# Patient Record
Sex: FEMALE | Race: WHITE | NOT HISPANIC OR LATINO
[De-identification: names, ages, dates, MRNs, and addresses within clinical notes are randomized per-mention and may not be internally consistent; named-entity substitution may affect disease eponyms.]

---

## 2017-11-14 ENCOUNTER — OTHER (OUTPATIENT)
Age: 37
End: 2017-11-14

## 2017-11-21 ENCOUNTER — APPOINTMENT (OUTPATIENT)
Dept: ORTHOPEDIC SURGERY | Facility: CLINIC | Age: 37
End: 2017-11-21

## 2018-11-04 ENCOUNTER — TRANSCRIPTION ENCOUNTER (OUTPATIENT)
Age: 38
End: 2018-11-04

## 2018-11-26 ENCOUNTER — APPOINTMENT (OUTPATIENT)
Dept: ORTHOPEDIC SURGERY | Facility: CLINIC | Age: 38
End: 2018-11-26
Payer: COMMERCIAL

## 2018-11-26 PROCEDURE — 99214 OFFICE O/P EST MOD 30 MIN: CPT

## 2018-11-30 ENCOUNTER — OTHER (OUTPATIENT)
Age: 38
End: 2018-11-30

## 2018-12-13 ENCOUNTER — APPOINTMENT (OUTPATIENT)
Dept: PHYSICAL MEDICINE AND REHAB | Facility: CLINIC | Age: 38
End: 2018-12-13
Payer: COMMERCIAL

## 2018-12-13 VITALS
BODY MASS INDEX: 42.17 KG/M2 | SYSTOLIC BLOOD PRESSURE: 179 MMHG | DIASTOLIC BLOOD PRESSURE: 85 MMHG | HEIGHT: 63 IN | WEIGHT: 238 LBS

## 2018-12-13 DIAGNOSIS — S16.1XXA STRAIN OF MUSCLE, FASCIA AND TENDON AT NECK LEVEL, INITIAL ENCOUNTER: ICD-10-CM

## 2018-12-13 PROCEDURE — 99203 OFFICE O/P NEW LOW 30 MIN: CPT

## 2019-01-07 ENCOUNTER — APPOINTMENT (OUTPATIENT)
Dept: ORTHOPEDIC SURGERY | Facility: CLINIC | Age: 39
End: 2019-01-07
Payer: COMMERCIAL

## 2019-01-07 VITALS
BODY MASS INDEX: 42.17 KG/M2 | WEIGHT: 238 LBS | HEART RATE: 83 BPM | DIASTOLIC BLOOD PRESSURE: 82 MMHG | HEIGHT: 63 IN | SYSTOLIC BLOOD PRESSURE: 138 MMHG

## 2019-01-07 DIAGNOSIS — M54.12 RADICULOPATHY, CERVICAL REGION: ICD-10-CM

## 2019-01-07 PROCEDURE — 99214 OFFICE O/P EST MOD 30 MIN: CPT

## 2019-01-07 NOTE — REVIEW OF SYSTEMS
[Discharge] : no discharge [Nasal Discharge] : no nasal discharge [Cough] : no cough [FreeTextEntry9] : left shoulder pain

## 2019-01-07 NOTE — PHYSICAL EXAM
[de-identified] : Patient is well appearing, in non acute distress with nonlabored breathing and appropriate mood and affect. Extra-ocular movements intact and no nasal discharge.\par \par Right UE warm and well perfused; palpable radial pulse\par SILT C5-T1\par motor intact to finger flexion, wrist extension and flexion, elbow flexion and extension, supination and pronation, deltoid\par \par Left UE warm and well perfused; palpable radial pulse\par SILT C5-T1\par motor intact to finger flexion, wrist extension and flexion, elbow flexion and extension, supination and pronation, deltoid\par \par \par Right Shoulder \par \par Appearance\par negative atrophy of musculature\par negative winging\par \par Tenderness to Palpation about the shoulder is negative\par \par Range of Motion: Active / Passive\par Forward Elevation: 140\par Abduction: And 35\par ER at 0 degrees of abduction: 45\par ER at 90 degrees of abduction: 80/85\par Internal Rotation T. 12\par \par Motor Strength\par Supraspinatus: 5 /5\par Infraspinatus: 5 /5\par Subscapularis: 5 /5\par Teres Minor: 5/ 5\par \par Provocative Maneuvers\par negative Drop Arm\par negative Belly Press\par negative Hornblowers\par negative Franklin\par negative Neer\par negative Speeds\par \par Left Shoulder \par \par Appearance\par negative atrophy of musculature\par negative winging\par \par Tenderness to Palpation about the shoulder is positive at the acromion, greater tuberosity, trapezius, and paraspinal muscles\par \par Range of Motion: Active / Passive\par Forward Elevation: 120/140\par Abduction: 130/140\par ER at 0 degrees of abduction: 40\par ER at 90 degrees of abduction: 80/85\par Internal Rotation 12\par \par Motor Strength\par Supraspinatus: 4 with pain/5\par Infraspinatus: 4 with pain/5\par Subscapularis: 5 /5\par Teres Minor: 5/ 5\par \par Provocative Maneuvers\par negative Drop Arm\par negative Belly Press\par negative Hornblowers\par Positive Franklin\par Positive Neer\par negative Speeds\par \par C spine\par \par negative tenderness to palpation\par Range of motion is painless\par Spurling is negative

## 2019-01-07 NOTE — ASSESSMENT
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 Patient has left shoulder pain and cervicalgia with signs and symptoms, today, most consistent with left shoulder impingement versus rotator cuff tear.  Discussed findings and diagnosis and the risks, benefits, and alternatives of all treatment options.  Given patient's pain has worsened I have ordered an MRI of her left shoulder without contrast. Patient will follow up after MRI. I've also ordered patient meloxicam 15 mg and risks were discussed.

## 2019-01-07 NOTE — HISTORY OF PRESENT ILLNESS
[de-identified] : Ms. HINOJOSA is a 38 year old female who being seen for follow-up of Left shoulder pain. 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 She was unable to afford physical therapy but she went to receive home exercises and has been doing this on her own. She states that she has been taking the meloxicam, 7.5, daily. She notes that since her last visit her pain has gotten somewhat worse. In particular it is quite tender. Patient did see physiatry for her neck and was recommended to continue with the anti-inflammatories and therapy. Was also recommended to followup with neck pain and paraspinal pain continues for consideration of MRI of the C-spine.  Patient states that her carpal tunnel symptoms have improved and are no longer present.

## 2019-01-14 ENCOUNTER — OUTPATIENT (OUTPATIENT)
Dept: OUTPATIENT SERVICES | Facility: HOSPITAL | Age: 39
LOS: 1 days | End: 2019-01-14
Payer: COMMERCIAL

## 2019-01-14 ENCOUNTER — APPOINTMENT (OUTPATIENT)
Dept: MRI IMAGING | Facility: CLINIC | Age: 39
End: 2019-01-14
Payer: COMMERCIAL

## 2019-01-14 DIAGNOSIS — Z00.8 ENCOUNTER FOR OTHER GENERAL EXAMINATION: ICD-10-CM

## 2019-01-14 PROCEDURE — 73221 MRI JOINT UPR EXTREM W/O DYE: CPT

## 2019-01-14 PROCEDURE — 73221 MRI JOINT UPR EXTREM W/O DYE: CPT | Mod: 26,LT

## 2019-01-28 ENCOUNTER — APPOINTMENT (OUTPATIENT)
Dept: ORTHOPEDIC SURGERY | Facility: CLINIC | Age: 39
End: 2019-01-28
Payer: COMMERCIAL

## 2019-01-28 ENCOUNTER — OTHER (OUTPATIENT)
Age: 39
End: 2019-01-28

## 2019-01-28 DIAGNOSIS — M75.42 IMPINGEMENT SYNDROME OF LEFT SHOULDER: ICD-10-CM

## 2019-01-28 PROCEDURE — 99214 OFFICE O/P EST MOD 30 MIN: CPT

## 2019-01-28 RX ORDER — CREAM BASE NO.31
CREAM (GRAM) MISCELLANEOUS
Qty: 100 | Refills: 1 | Status: ACTIVE | COMMUNITY
Start: 2019-01-28 | End: 1900-01-01

## 2019-01-28 NOTE — HISTORY OF PRESENT ILLNESS
[de-identified] : Ms. HINOJOSA is a 38 year old female who is being seen for follow-up of Left shoulder pain. Pain unchanged.

## 2019-01-28 NOTE — REVIEW OF SYSTEMS
[Joint Pain] : joint pain [Chills] : no chills [Discharge] : no discharge [Cough] : no cough [FreeTextEntry9] : Left shoulder

## 2019-01-28 NOTE — PHYSICAL EXAM
[de-identified] : Patient is well appearing, in non acute distress with nonlabored breathing and appropriate mood and affect. Extra-ocular movements intact and no nasal discharge.\par \par Right UE warm and well perfused; palpable radial pulse\par SILT C5-T1\par motor intact to finger flexion, wrist extension and flexion, elbow flexion and extension, supination and pronation, deltoid\par \par Left UE warm and well perfused; palpable radial pulse\par SILT C5-T1\par motor intact to finger flexion, wrist extension and flexion, elbow flexion and extension, supination and pronation, deltoid\par \par Right Shoulder \par \par Appearance\par negative atrophy of musculature\par negative winging\par \par Tenderness to Palpation about the shoulder is negative\par \par Range of Motion: Active / Passive\par Forward Elevation: full \par Abduction: full \par ER at 0 degrees of abduction: full \par ER at 90 degrees of abduction: full \par Internal Rotation full\par \par Motor Strength\par Supraspinatus: 5 /5\par Infraspinatus: 5 /5\par Subscapularis: 5 /5\par Teres Minor: 5/ 5\par \par Provocative Maneuvers\par negative Drop Arm\par negative Belly Press\par negative Hornblowers\par negative Franklin\par negative Neer\par negative Speeds\par \par Left Shoulder \par \par Appearance\par negative atrophy of musculature\par negative winging\par \par Tenderness to Palpation about the shoulder is positive at the greater tuberosity and subdeltoid bursa and trapezius and proximal biceps and AC joint\par \par Range of Motion: Active / Passive\par Forward Elevation: full \par Abduction: full \par ER at 0 degrees of abduction: full \par ER at 90 degrees of abduction: full \par Internal Rotation full\par \par Motor Strength\par Supraspinatus: 4 with pain/5\par Infraspinatus: 4 with pain/5\par Subscapularis: 5 /5\par Teres Minor: 5/ 5\par \par Provocative Maneuvers\par negative Drop Arm\par negative Belly Press\par negative Hornblowers\par Positive Franklin\par Positive Neer\par negative Speeds\par Negative [place]'s\par Positive cross body\par \par C spine\par \par Trapezius positive tenderness to palpation\par Range of motion is pain with rotation to the right\par Spurling is negative [de-identified] : Patient had an MRI of her left shoulder on January 14, 2018 [facility] imaging a [place]. The images were visualized reviewed by me, findings below:\par Advanced arthrosis of the a.c. joint with subchondral cystic change of marrow edema on both sides of the joint. There is trace fluid in the subacromial subdeltoid bursa. There is mild tendinosis of the supraspinatus. No high-grade partial-thickness or full-thickness rotator cuff tear. Biceps is normally situated in the groove without tear. SLAP tear suspected though evaluation is limited by motion. No atrophy.

## 2019-01-28 NOTE — ASSESSMENT
[FreeTextEntry1] : Patient is a 38-year-old female with signs and symptoms consistent with left shoulder rotator cuff tendinitis and a.c. joint arthritis.Discussed findings and diagnosis and the risks, benefits, and alternatives of all treatment options. Patient wished to proceed with a steroid injection today, however, her sugar this morning was 210. She is seeing her PCP tomorrow to have this evaluated. Steroid injection deferred until better control of her blood sugar. In the meantime I've prescribed transdermal compound gel; this discussed. Also discussed activity modification the patient. He she will followup if pain persists and or worsens then we'll consider steroid injection pending better control of her glucose.

## 2020-06-24 ENCOUNTER — APPOINTMENT (OUTPATIENT)
Dept: ORTHOPEDIC SURGERY | Facility: CLINIC | Age: 40
End: 2020-06-24
Payer: COMMERCIAL

## 2020-06-24 VITALS
HEART RATE: 88 BPM | BODY MASS INDEX: 42.17 KG/M2 | HEIGHT: 63 IN | SYSTOLIC BLOOD PRESSURE: 118 MMHG | WEIGHT: 238 LBS | DIASTOLIC BLOOD PRESSURE: 82 MMHG

## 2020-06-24 DIAGNOSIS — M89.8X1 OTHER SPECIFIED DISORDERS OF BONE, SHOULDER: ICD-10-CM

## 2020-06-24 DIAGNOSIS — M54.2 CERVICALGIA: ICD-10-CM

## 2020-06-24 PROCEDURE — 73030 X-RAY EXAM OF SHOULDER: CPT | Mod: LT

## 2020-06-24 PROCEDURE — 99214 OFFICE O/P EST MOD 30 MIN: CPT

## 2020-06-24 PROCEDURE — 72040 X-RAY EXAM NECK SPINE 2-3 VW: CPT

## 2020-06-24 NOTE — PHYSICAL EXAM
[Normal LUE] : Left Upper Extremity: No scars, rashes, lesions, ulcers, skin intact [Normal Finger/nose] : finger to nose coordination [Normal] : Alert and in no acute distress [de-identified] : Left shoulder exam\par \par Inspection: No malalignment, No defects\par Skin: No masses, No lesions\par Neck: Negative Spurlings, full ROM without pain\par ROM: Active range of motion intact bilaterally \par Painful arc ROM: None\par Tenderness: No bicipital tenderness, no tenderness to the greater tuberosity/RTC insertion, no anterior shoulder/lesser tuberosity tenderness, moderate tenderness in the periscapular region\par Strength: 5/5 ER, 5/5 IR in adduction, 5/5 supraspinatus testing\par AC Joint: No ttp, no pain with cross arm testing\par Biceps: Speed negative, Yergusons negative\par Impingement test: Negative Franklin\par Stability: Negative apprehension, negative anterior/posterior load and shift\par Vasc: 2+ radial pulse\par Neuro: AIN, PIN, Ulnar nerve in tact to motor\par Sensation: Intact to light touch throughout\par \par  [de-identified] : freddier [de-identified] : aP and lateral x-rays of the cervical spine are reviewed no osseous abnormalities noted\par \par 3 x-ray views of the left shoulder are reviewed there is mild a.c. joint arthritis no other osseous abnormalities\par \par

## 2020-06-24 NOTE — HISTORY OF PRESENT ILLNESS
[FreeTextEntry1] : 06/24/2020: The patient is a 40-year-old right-hand-dominant female who presents to the office with posterior shoulder pain that radiates from her neck. It has been going on for about one year. She denies any specific injury. She notes that she also has paresthesias that travel down the posterior aspect of her arm to her hand on the right side. She has tried Advil and Aleve which gives him improvement. She was recently placed on a Medrol Dosepak that she was unable to tolerate the side effects. She presents to the office today for further treatment options.

## 2020-06-24 NOTE — ASSESSMENT
[FreeTextEntry1] : 40 -year-old female with left superior and posterior shoulder pain consistent with periscapular pain. I recommend followup with physiatry for evaluation and possible trigger point injections.I also recommend a course of anti-inflammatory medication, the patient will followup p.r.n.

## 2020-06-25 RX ORDER — MELOXICAM 15 MG/1
15 TABLET ORAL DAILY
Qty: 30 | Refills: 1 | Status: ACTIVE | COMMUNITY
Start: 2020-06-25 | End: 1900-01-01

## 2020-07-08 ENCOUNTER — APPOINTMENT (OUTPATIENT)
Dept: PHYSICAL MEDICINE AND REHAB | Facility: CLINIC | Age: 40
End: 2020-07-08
Payer: COMMERCIAL

## 2020-07-08 VITALS
HEART RATE: 78 BPM | SYSTOLIC BLOOD PRESSURE: 128 MMHG | BODY MASS INDEX: 42.17 KG/M2 | HEIGHT: 63 IN | DIASTOLIC BLOOD PRESSURE: 85 MMHG | WEIGHT: 238 LBS

## 2020-07-08 DIAGNOSIS — M19.012 PRIMARY OSTEOARTHRITIS, LEFT SHOULDER: ICD-10-CM

## 2020-07-08 DIAGNOSIS — Z86.39 PERSONAL HISTORY OF OTHER ENDOCRINE, NUTRITIONAL AND METABOLIC DISEASE: ICD-10-CM

## 2020-07-08 PROCEDURE — 99213 OFFICE O/P EST LOW 20 MIN: CPT

## 2020-07-08 RX ORDER — MELOXICAM 7.5 MG/1
7.5 TABLET ORAL
Qty: 30 | Refills: 1 | Status: DISCONTINUED | COMMUNITY
Start: 2018-11-30 | End: 2020-07-08

## 2020-07-08 RX ORDER — MONTELUKAST SODIUM 10 MG/1
10 TABLET, FILM COATED ORAL
Refills: 0 | Status: ACTIVE | COMMUNITY

## 2020-07-08 RX ORDER — MELOXICAM 7.5 MG/1
7.5 TABLET ORAL TWICE DAILY
Qty: 40 | Refills: 0 | Status: DISCONTINUED | COMMUNITY
Start: 2020-06-24 | End: 2020-07-08

## 2020-07-08 RX ORDER — EMPAGLIFLOZIN 25 MG/1
25 TABLET, FILM COATED ORAL
Refills: 0 | Status: ACTIVE | COMMUNITY

## 2020-07-08 RX ORDER — METFORMIN HYDROCHLORIDE 500 MG/1
500 TABLET, COATED ORAL
Refills: 0 | Status: ACTIVE | COMMUNITY

## 2020-07-08 RX ORDER — ALBUTEROL SULFATE 90 UG/1
108 (90 BASE) AEROSOL, METERED RESPIRATORY (INHALATION)
Refills: 0 | Status: ACTIVE | COMMUNITY

## 2020-07-08 RX ORDER — MELOXICAM 7.5 MG/1
7.5 TABLET ORAL
Qty: 30 | Refills: 1 | Status: DISCONTINUED | COMMUNITY
Start: 2018-11-26 | End: 2020-07-08

## 2020-07-08 RX ORDER — DULAGLUTIDE 0.75 MG/.5ML
0.75 INJECTION, SOLUTION SUBCUTANEOUS
Refills: 0 | Status: ACTIVE | COMMUNITY

## 2020-07-08 RX ORDER — MELOXICAM 15 MG/1
15 TABLET ORAL
Qty: 30 | Refills: 1 | Status: DISCONTINUED | COMMUNITY
Start: 2019-01-07 | End: 2020-07-08

## 2020-07-08 NOTE — DATA REVIEWED
[MRI] : MRI [FreeTextEntry1] : MRI Left Shoulder (Jan 2019): Advanced acromioclavicular joint arthrosis with marginating subchondral cystic change and marrow edema on both sides of the joint.  Suspected SLAP tear.

## 2020-07-08 NOTE — HISTORY OF PRESENT ILLNESS
[FreeTextEntry1] : 40 y.o. F w/ h/o asthma, hypothyroidism and DM presents to office w/ c/o left shoulder pain x 2 months.  Pt. denies antecedent trauma or injury to shoulder.  Pt. endorses h/o intermittent neck pain but believes that her shoulder pain is separate and unrelated.  Pain can radiate towards helpful.  c/o painful AROM w/ performing overhead activities.  X-rays done.  Pt. states that her insurance co. does not cover P.T.  Saw Dr. Cook who Rx meloxicam which helps somewhat.  No injections.

## 2020-07-08 NOTE — ASSESSMENT
[FreeTextEntry1] : 40 y.o. F w/ left shoulder pain likely 2' advanced AC joint arthrosis.  Rx P.T. for modalities, gentle ROM, stretching and strengthening exercises.  Agree w/ short courses of meloxicam as previously prescribed.  Discussed R/B/A to USG LEFT SHOULDER AC JOINT CSI which patient will consider.  RTC 6-8 weeks or sooner should need arise.

## 2020-07-08 NOTE — PHYSICAL EXAM
[FreeTextEntry1] : NAD\par A&Ox3\par Obese\par ROM LEFT Shoulder: 140' ABD/FF w/ pain\par Neer's: +\par Hawkin's: +\par Drop Arm: neg\par Scarf: +\par RC MMT: 5/5 b/l SS/IS\par Palpation: AC joint VTTP; mild TTP distal SS tendon insertion\par

## 2025-01-13 ENCOUNTER — APPOINTMENT (OUTPATIENT)
Dept: ORTHOPEDIC SURGERY | Facility: CLINIC | Age: 45
End: 2025-01-13

## 2025-01-13 ENCOUNTER — APPOINTMENT (OUTPATIENT)
Dept: ORTHOPEDIC SURGERY | Facility: CLINIC | Age: 45
End: 2025-01-13
Payer: COMMERCIAL

## 2025-01-13 VITALS
SYSTOLIC BLOOD PRESSURE: 146 MMHG | WEIGHT: 214 LBS | BODY MASS INDEX: 37.92 KG/M2 | HEIGHT: 63 IN | HEART RATE: 94 BPM | DIASTOLIC BLOOD PRESSURE: 82 MMHG

## 2025-01-13 DIAGNOSIS — M25.512 PAIN IN LEFT SHOULDER: ICD-10-CM

## 2025-01-13 DIAGNOSIS — M19.012 PRIMARY OSTEOARTHRITIS, LEFT SHOULDER: ICD-10-CM

## 2025-01-13 PROCEDURE — 73030 X-RAY EXAM OF SHOULDER: CPT | Mod: LT

## 2025-01-13 PROCEDURE — 99204 OFFICE O/P NEW MOD 45 MIN: CPT

## 2025-01-13 RX ORDER — MELOXICAM 7.5 MG/1
7.5 TABLET ORAL
Qty: 28 | Refills: 2 | Status: ACTIVE | COMMUNITY
Start: 2025-01-13 | End: 1900-01-01

## 2025-01-14 PROBLEM — M25.512 ACUTE PAIN OF LEFT SHOULDER: Status: ACTIVE | Noted: 2025-01-13

## 2025-04-03 ENCOUNTER — APPOINTMENT (OUTPATIENT)
Dept: ORTHOPEDIC SURGERY | Facility: CLINIC | Age: 45
End: 2025-04-03
Payer: COMMERCIAL

## 2025-04-03 VITALS — BODY MASS INDEX: 37.05 KG/M2 | HEIGHT: 64 IN | WEIGHT: 217 LBS

## 2025-04-03 DIAGNOSIS — M25.512 PAIN IN LEFT SHOULDER: ICD-10-CM

## 2025-04-03 PROCEDURE — 99203 OFFICE O/P NEW LOW 30 MIN: CPT

## 2025-05-15 ENCOUNTER — APPOINTMENT (OUTPATIENT)
Dept: ORTHOPEDIC SURGERY | Facility: CLINIC | Age: 45
End: 2025-05-15
Payer: COMMERCIAL

## 2025-05-15 VITALS
WEIGHT: 217 LBS | HEIGHT: 64 IN | SYSTOLIC BLOOD PRESSURE: 146 MMHG | BODY MASS INDEX: 37.05 KG/M2 | DIASTOLIC BLOOD PRESSURE: 80 MMHG | HEART RATE: 85 BPM

## 2025-05-15 DIAGNOSIS — M25.512 PAIN IN LEFT SHOULDER: ICD-10-CM

## 2025-05-15 PROCEDURE — 20610 DRAIN/INJ JOINT/BURSA W/O US: CPT | Mod: LT

## 2025-05-15 PROCEDURE — 99214 OFFICE O/P EST MOD 30 MIN: CPT | Mod: 25

## 2025-06-26 ENCOUNTER — APPOINTMENT (OUTPATIENT)
Dept: ORTHOPEDIC SURGERY | Facility: CLINIC | Age: 45
End: 2025-06-26

## 2025-09-08 ENCOUNTER — APPOINTMENT (OUTPATIENT)
Dept: ORTHOPEDIC SURGERY | Facility: CLINIC | Age: 45
End: 2025-09-08
Payer: COMMERCIAL

## 2025-09-08 VITALS — HEIGHT: 65 IN | BODY MASS INDEX: 36.65 KG/M2 | WEIGHT: 220 LBS

## 2025-09-08 DIAGNOSIS — M25.512 PAIN IN LEFT SHOULDER: ICD-10-CM

## 2025-09-08 PROCEDURE — 99214 OFFICE O/P EST MOD 30 MIN: CPT | Mod: 25

## 2025-09-08 PROCEDURE — 20611 DRAIN/INJ JOINT/BURSA W/US: CPT | Mod: LT

## 2025-09-22 PROBLEM — M75.02 ADHESIVE CAPSULITIS OF LEFT SHOULDER: Status: ACTIVE | Noted: 2025-09-22
